# Patient Record
Sex: MALE | Race: WHITE | Employment: FULL TIME | URBAN - METROPOLITAN AREA
[De-identification: names, ages, dates, MRNs, and addresses within clinical notes are randomized per-mention and may not be internally consistent; named-entity substitution may affect disease eponyms.]

---

## 2018-07-24 ENCOUNTER — TELEPHONE (OUTPATIENT)
Dept: INTERNAL MEDICINE CLINIC | Age: 53
End: 2018-07-24

## 2018-07-27 ENCOUNTER — OFFICE VISIT (OUTPATIENT)
Dept: INTERNAL MEDICINE CLINIC | Age: 53
End: 2018-07-27

## 2018-07-27 VITALS
BODY MASS INDEX: 28.58 KG/M2 | HEIGHT: 69 IN | RESPIRATION RATE: 12 BRPM | SYSTOLIC BLOOD PRESSURE: 120 MMHG | WEIGHT: 193 LBS | DIASTOLIC BLOOD PRESSURE: 74 MMHG

## 2018-07-27 DIAGNOSIS — H69.83 EUSTACHIAN TUBE DYSFUNCTION, BILATERAL: ICD-10-CM

## 2018-07-27 DIAGNOSIS — H93.8X3 CLOGGED EAR, BILATERAL: Primary | ICD-10-CM

## 2018-07-27 PROCEDURE — 3017F COLORECTAL CA SCREEN DOC REV: CPT | Performed by: INTERNAL MEDICINE

## 2018-07-27 PROCEDURE — 4004F PT TOBACCO SCREEN RCVD TLK: CPT | Performed by: INTERNAL MEDICINE

## 2018-07-27 PROCEDURE — G8428 CUR MEDS NOT DOCUMENT: HCPCS | Performed by: INTERNAL MEDICINE

## 2018-07-27 PROCEDURE — G8419 CALC BMI OUT NRM PARAM NOF/U: HCPCS | Performed by: INTERNAL MEDICINE

## 2018-07-27 PROCEDURE — 99202 OFFICE O/P NEW SF 15 MIN: CPT | Performed by: INTERNAL MEDICINE

## 2018-07-27 RX ORDER — METHYLPREDNISOLONE 4 MG/1
4 TABLET ORAL SEE ADMIN INSTRUCTIONS
Qty: 1 KIT | Refills: 1 | Status: SHIPPED | OUTPATIENT
Start: 2018-07-27 | End: 2018-08-02

## 2018-07-27 NOTE — PROGRESS NOTES
[facility]) Physicians  Internal Medicine  Patient Encounter  Myke Husain D.O., [facility]        Chief Complaint   Patient presents with    Ear Problem     both ears       HPI: 48 y.o. male seen urgently today as a new patient. He was referred by another patient here in the practice. He is seen with C/O BL ear problem. Patient was seen at a little clinic back in May and was diagnosed with an ear infection. His symptoms included right ear pain that developed acutely. He did not have a hearing change. He felt feverish. He states the pain was horrible. He was told he had acute otitis media. He was treated with amoxicillin. He states his symptoms got worse. The first two days he had severe pain. He did have some drainage out of the ear. He started getting left sided symptoms about 5 days later. He started to have left ear pain. The pain has resolved. He has continued to have a clogged sensation in both ears. Hearing is not changed. He denies feeling fevers. He denies clicking or popping. He denies vertigo. He was previously under the care of a doctor in [place]. Past Medical History:   Diagnosis Date    Scoliosis          MEDICATIONS:  Prior to Visit Medications    Medication Sig Taking? Authorizing Provider   methylPREDNISolone (MEDROL, MIRIAN,) 4 MG tablet Take 1 tablet by mouth See Admin Instructions for 6 days Take by mouth. Yes Myke Husain DO           Review of Systems - As per HPI  GEN: Pt denies fever, chills or night sweats. Denies weight changes. Appetite good  HEENT: Pt denies visual and auditory changes, epistaxis, upper respiratory symptoms and dysphagia  CV: Pt denies CP, SOB, ROBERTS, orthopnea palpitations, LE swelling, and claudication. PULM: Pt denies cough, wheezing or sputum production  GI: Pt denies N/V/D, heart burn, rectal bleeding, or melena. NEURO: Pt denies unilateral weakness, paresthesia and dizziness.     OBJECTIVE:  /74   Resp 12   Ht 5'